# Patient Record
Sex: MALE | Race: WHITE | NOT HISPANIC OR LATINO | Employment: FULL TIME | URBAN - METROPOLITAN AREA
[De-identification: names, ages, dates, MRNs, and addresses within clinical notes are randomized per-mention and may not be internally consistent; named-entity substitution may affect disease eponyms.]

---

## 2024-02-19 PROBLEM — I25.10 CAD (CORONARY ARTERY DISEASE): Status: ACTIVE | Noted: 2024-02-19

## 2024-02-19 PROBLEM — N50.819 PERSISTENT PAIN IN TESTICLE: Status: ACTIVE | Noted: 2024-02-19

## 2024-02-19 PROBLEM — M43.10 PARS DEFECT WITH SPONDYLOLISTHESIS: Status: ACTIVE | Noted: 2024-02-19

## 2024-02-19 PROBLEM — E78.5 DYSLIPIDEMIA: Status: ACTIVE | Noted: 2024-02-19

## 2024-02-19 PROBLEM — J02.9 SORE THROAT: Status: ACTIVE | Noted: 2024-02-19

## 2024-02-19 PROBLEM — R31.9 HEMATURIA: Status: ACTIVE | Noted: 2024-02-19

## 2024-02-19 PROBLEM — R42 LIGHTHEADEDNESS: Status: ACTIVE | Noted: 2024-02-19

## 2024-02-19 PROBLEM — G47.33 OBSTRUCTIVE SLEEP APNEA: Status: ACTIVE | Noted: 2024-02-19

## 2024-02-19 PROBLEM — J06.9 URI WITH COUGH AND CONGESTION: Status: ACTIVE | Noted: 2024-02-19

## 2024-02-19 PROBLEM — R36.1 HEMATOSPERMIA: Status: ACTIVE | Noted: 2024-02-19

## 2024-02-19 PROBLEM — E83.52 HYPERCALCEMIA: Status: ACTIVE | Noted: 2024-02-19

## 2024-02-19 PROBLEM — R05.9 COUGH: Status: ACTIVE | Noted: 2024-02-19

## 2024-02-19 PROBLEM — I21.3 ST ELEVATION MI (STEMI) (MULTI): Status: ACTIVE | Noted: 2024-02-19

## 2024-02-19 RX ORDER — ASPIRIN 81 MG/1
81 TABLET ORAL DAILY
COMMUNITY
Start: 2021-06-28

## 2024-02-19 RX ORDER — NITROGLYCERIN 0.4 MG/1
0.4 TABLET SUBLINGUAL EVERY 5 MIN PRN
COMMUNITY
Start: 2021-06-28

## 2024-02-19 RX ORDER — GUAIFENESIN 600 MG/1
600 TABLET, EXTENDED RELEASE ORAL EVERY 12 HOURS PRN
COMMUNITY

## 2024-02-19 RX ORDER — ACETAMINOPHEN 325 MG/1
325 TABLET ORAL EVERY 6 HOURS PRN
COMMUNITY
Start: 2021-07-01

## 2024-02-19 RX ORDER — METOPROLOL SUCCINATE 25 MG/1
25 TABLET, EXTENDED RELEASE ORAL DAILY
COMMUNITY
Start: 2021-07-19

## 2024-02-19 RX ORDER — ATORVASTATIN CALCIUM 80 MG/1
80 TABLET, FILM COATED ORAL DAILY
COMMUNITY
Start: 2022-03-07 | End: 2024-03-29

## 2024-03-29 DIAGNOSIS — E78.5 DYSLIPIDEMIA: ICD-10-CM

## 2024-03-29 RX ORDER — ATORVASTATIN CALCIUM 80 MG/1
80 TABLET, FILM COATED ORAL DAILY
Qty: 90 TABLET | Refills: 3 | Status: SHIPPED | OUTPATIENT
Start: 2024-03-29

## 2024-06-20 ENCOUNTER — APPOINTMENT (OUTPATIENT)
Dept: CARDIOLOGY | Facility: CLINIC | Age: 49
End: 2024-06-20
Payer: COMMERCIAL

## 2024-06-20 VITALS
HEART RATE: 66 BPM | WEIGHT: 196 LBS | OXYGEN SATURATION: 97 % | DIASTOLIC BLOOD PRESSURE: 70 MMHG | BODY MASS INDEX: 28.06 KG/M2 | SYSTOLIC BLOOD PRESSURE: 134 MMHG | HEIGHT: 70 IN

## 2024-06-20 DIAGNOSIS — E78.5 DYSLIPIDEMIA: ICD-10-CM

## 2024-06-20 DIAGNOSIS — R03.0 ELEVATED BLOOD PRESSURE READING IN OFFICE WITH WHITE COAT SYNDROME, WITHOUT DIAGNOSIS OF HYPERTENSION: ICD-10-CM

## 2024-06-20 DIAGNOSIS — I25.10 CORONARY ARTERY DISEASE INVOLVING NATIVE CORONARY ARTERY OF NATIVE HEART WITHOUT ANGINA PECTORIS: Primary | ICD-10-CM

## 2024-06-20 PROCEDURE — 93000 ELECTROCARDIOGRAM COMPLETE: CPT | Performed by: INTERNAL MEDICINE

## 2024-06-20 PROCEDURE — 99214 OFFICE O/P EST MOD 30 MIN: CPT | Performed by: INTERNAL MEDICINE

## 2024-06-20 PROCEDURE — 1036F TOBACCO NON-USER: CPT | Performed by: INTERNAL MEDICINE

## 2024-06-20 ASSESSMENT — ENCOUNTER SYMPTOMS
LOSS OF SENSATION IN FEET: 0
DEPRESSION: 0
OCCASIONAL FEELINGS OF UNSTEADINESS: 0

## 2024-06-20 ASSESSMENT — PAIN SCALES - GENERAL: PAINLEVEL: 0-NO PAIN

## 2024-06-20 NOTE — PROGRESS NOTES
Chief Complaint:   No chief complaint on file.     History Of Present Illness:    Jarad Johns is a 48 y.o. male with a h/o CAD s/p STEMI 6/21/21 (PCI of the LCx and RCA), mild mitral and aortic regurgitation, and dyslipidemia who is here for routine follow-up.      Had 1 episode of a blurred hemispherical in the left side of his vision of his left and right eyes while he was at work.  Lasted several seconds and then subsided.  Has not recurred.  1 other day he was outside in the heat at a baseball game.  He came back in and sat down and had double vision for about 30 seconds.  Has not had that recur either.  His wife remembers the that was very warm that day.    Denies any exertional chest pain or shortness of breath.  Does have pain in the back.  If he straightens his shoulders or cracks his back the symptom goes away.     His father had carotid disease and carotid endarterectomy in his 50s.     Echocardiogram 6/13/2022: EF 55 to 60%. Mild left atrial enlargement. Mild MR. Mild AR.     PCI of the RCA 7/1/21: Resolute [place] 3.5 x 30 mm LAMAR     Echocardiogram 6/22/21 EF 50-55%. Basal to apical inferolateral, and mid anterolateral wall hypokinesis. Mild AR     iFR of the LAD 6/22/21: mid LAD 0.99, mid distal 0.91     6/21/21: STEMI with PCI of the OM1 with Resolute [place] 3 x 18 mm LAMAR      Past Medical History:  He has a past medical history of Other conditions influencing health status (09/09/2020).    Past Surgical History:  He has a past surgical history that includes Other surgical history (08/21/2020).      Social History:  He reports that he has never smoked. He has never used smokeless tobacco. He reports current alcohol use. He reports that he does not use drugs.    Family History:  No family history on file.     Allergies:  Penicillins    Outpatient Medications:  Current Outpatient Medications   Medication Instructions    acetaminophen (TYLENOL) 325 mg, oral, Every 6 hours PRN    aspirin 81 mg, oral, Daily  "   atorvastatin (LIPITOR) 80 mg, oral, Daily    guaiFENesin (MUCINEX) 600 mg, oral, Every 12 hours PRN       Last Recorded Vitals:  Visit Vitals  /70 (BP Location: Left arm, Patient Position: Sitting)   Pulse 66   Ht 1.778 m (5' 10\")   Wt 88.9 kg (196 lb)   SpO2 97%   BMI 28.12 kg/m²   Smoking Status Never   BSA 2.1 m²      LASTWT(3):   Wt Readings from Last 3 Encounters:   06/20/24 88.9 kg (196 lb)   06/19/23 87.5 kg (193 lb)   12/14/22 84.8 kg (187 lb)       Physical Exam:  In general: alert and in no acute distress.   HEENT: Carotid upstrokes normal with no bruits. JVP is normal.   Pulmonary: Clear to auscultation bilaterally.  Cardiovascular: S1,S2, regular. No appreciable murmurs, rubs or gallops.   Lower extremities: Warm. 2+ distal pulses. No edema.       Last Labs:  CBC -  Recent Labs     02/04/23 0941 06/21/21 1940 08/21/20  1439   WBC 3.6* 6.2 5.1   HGB 15.2 15.8 15.0   HCT 43.4 45.9 44.6    207 189   MCV 91 86 91       CMP -  Recent Labs     02/04/23 0941 06/23/21  0420 06/22/21 0403 06/21/21  2252    136 137  --    K 4.4 4.3 4.0  --     107 104  --    CO2 31 23 22  --    ANIONGAP 9* 10 15  --    BUN 18 15 12  --    CREATININE 0.93 0.88 0.82  --    MG  --  2.40 2.20 2.20     Recent Labs     02/04/23 0941 06/21/21 1940 08/21/20  1439   ALBUMIN 4.7 5.3* 4.9   ALKPHOS 42 50 42   ALT 34 32 25   AST 27 28 21   BILITOT 0.9 0.7 0.7       LIPID PANEL -   Recent Labs     02/04/23 0941 06/22/21 0403   CHOL 119 191   LDLF 65 128*   HDL 43.0 33.0*   TRIG 57 152*       Recent Labs     06/22/21 0403 06/21/21 1940   BNP  --  54   HGBA1C 5.1  --            Assessment/Plan   1) CAD with STEMI 6/21/21 successful PCI of the OM branch. Interval PCI of the RCA. Doing well. No need for additional testing at this time.  Did speak about the ISCHEMIA Trial.    Last echocardiogram June 2022 with normal LV function.  Told him we would consider routine echocardiography every 5 years     2) " dyslipidemia: Continue high-intensity statin. LDL at goal.  Told him that I doubted the back pain was from his statin.  Would feel better about him staying on statin rather than stopping it and seeing if the back pain improved.     3) elevated blood pressure without hypertension: Blood pressure been well-controlled.  No changes needed.     4) visual disturbance: First episode sounds like an ocular migraine.  I do not know how to explain the double vision episode.  Asked him to speak with an ophthalmologist.     5) follow-up: 12 months or sooner if needed       Rip Thapa MD

## 2024-07-26 DIAGNOSIS — I25.10 CORONARY ARTERY DISEASE, UNSPECIFIED VESSEL OR LESION TYPE, UNSPECIFIED WHETHER ANGINA PRESENT, UNSPECIFIED WHETHER NATIVE OR TRANSPLANTED HEART: ICD-10-CM

## 2024-07-26 RX ORDER — ASPIRIN 81 MG/1
81 TABLET ORAL DAILY
Qty: 90 TABLET | Refills: 3 | Status: SHIPPED | OUTPATIENT
Start: 2024-07-26

## 2024-10-09 ENCOUNTER — TELEPHONE (OUTPATIENT)
Dept: PEDIATRICS | Facility: CLINIC | Age: 49
End: 2024-10-09
Payer: COMMERCIAL

## 2024-10-09 NOTE — TELEPHONE ENCOUNTER
Pt is in need of a new PCP and was recommended to Dr. Wilder by his cardiologist, Dr. Thapa. He would like to be a new pt if possible.

## 2024-11-04 ENCOUNTER — APPOINTMENT (OUTPATIENT)
Dept: PRIMARY CARE | Facility: CLINIC | Age: 49
End: 2024-11-04
Payer: COMMERCIAL

## 2024-11-04 VITALS
HEART RATE: 70 BPM | DIASTOLIC BLOOD PRESSURE: 70 MMHG | TEMPERATURE: 97 F | SYSTOLIC BLOOD PRESSURE: 128 MMHG | OXYGEN SATURATION: 98 % | WEIGHT: 203 LBS | RESPIRATION RATE: 14 BRPM | HEIGHT: 70 IN | BODY MASS INDEX: 29.06 KG/M2

## 2024-11-04 DIAGNOSIS — Z00.00 PERIODIC HEALTH ASSESSMENT, GENERAL SCREENING, ADULT: ICD-10-CM

## 2024-11-04 DIAGNOSIS — I10 ESSENTIAL (PRIMARY) HYPERTENSION: ICD-10-CM

## 2024-11-04 DIAGNOSIS — I25.10 CORONARY ARTERY DISEASE INVOLVING NATIVE CORONARY ARTERY OF NATIVE HEART WITHOUT ANGINA PECTORIS: ICD-10-CM

## 2024-11-04 DIAGNOSIS — E78.00 PURE HYPERCHOLESTEROLEMIA: ICD-10-CM

## 2024-11-04 DIAGNOSIS — Z12.5 SCREENING FOR PROSTATE CANCER: ICD-10-CM

## 2024-11-04 DIAGNOSIS — R03.0 ELEVATED BLOOD PRESSURE READING IN OFFICE WITH WHITE COAT SYNDROME, WITHOUT DIAGNOSIS OF HYPERTENSION: Primary | ICD-10-CM

## 2024-11-04 PROCEDURE — 3078F DIAST BP <80 MM HG: CPT | Performed by: INTERNAL MEDICINE

## 2024-11-04 PROCEDURE — 90471 IMMUNIZATION ADMIN: CPT | Performed by: INTERNAL MEDICINE

## 2024-11-04 PROCEDURE — 3008F BODY MASS INDEX DOCD: CPT | Performed by: INTERNAL MEDICINE

## 2024-11-04 PROCEDURE — 99396 PREV VISIT EST AGE 40-64: CPT | Performed by: INTERNAL MEDICINE

## 2024-11-04 PROCEDURE — 1036F TOBACCO NON-USER: CPT | Performed by: INTERNAL MEDICINE

## 2024-11-04 PROCEDURE — 3074F SYST BP LT 130 MM HG: CPT | Performed by: INTERNAL MEDICINE

## 2024-11-04 PROCEDURE — 90656 IIV3 VACC NO PRSV 0.5 ML IM: CPT | Performed by: INTERNAL MEDICINE

## 2024-11-04 RX ORDER — MINERAL OIL
180 ENEMA (ML) RECTAL DAILY
COMMUNITY

## 2024-11-04 ASSESSMENT — ENCOUNTER SYMPTOMS
PALPITATIONS: 0
WHEEZING: 0
SHORTNESS OF BREATH: 0
BACK PAIN: 1
CONSTIPATION: 0
NAUSEA: 0
DIARRHEA: 0
COUGH: 0
ABDOMINAL PAIN: 0

## 2024-11-04 NOTE — PROGRESS NOTES
"Subjective   Patient ID: Jarad Johns is a 48 y.o. male who presents for Annual Exam (npt).    Overall doing well.  Patient is fairly active.  Denies any issues with CP,SOB or dizzy spells.  No issues with anxiety, depression or sleep related problems. Denies any issues with HA, numbness or tingling.  No issues or changes with bowel or bladder habits.      Review of Systems   Respiratory:  Negative for cough, shortness of breath and wheezing.    Cardiovascular:  Negative for palpitations.   Gastrointestinal:  Negative for abdominal pain, constipation, diarrhea and nausea.   Musculoskeletal:  Positive for back pain.   ROS is otherwise unremarkable.       Objective   /70 (BP Location: Left arm, Patient Position: Sitting, BP Cuff Size: Adult)   Pulse 70   Temp 36.1 °C (97 °F) (Tympanic)   Resp 14   Ht 1.778 m (5' 10\")   Wt 92.1 kg (203 lb)   SpO2 98%   BMI 29.13 kg/m²     Physical Exam  Vitals reviewed.   Constitutional:       Appearance: Normal appearance.   HENT:      Head: Normocephalic.   Eyes:      Pupils: Pupils are equal, round, and reactive to light.   Cardiovascular:      Rate and Rhythm: Normal rate and regular rhythm.   Pulmonary:      Effort: Pulmonary effort is normal.      Breath sounds: Normal breath sounds.   Musculoskeletal:         General: Normal range of motion.   Neurological:      General: No focal deficit present.      Mental Status: He is alert.   Psychiatric:         Mood and Affect: Mood normal.         Assessment/Plan   Problem List Items Addressed This Visit             ICD-10-CM    CAD (coronary artery disease) I25.10    Elevated blood pressure reading in office with white coat syndrome, without diagnosis of hypertension - Primary R03.0     Other Visit Diagnoses         Codes    Periodic health assessment, general screening, adult     Z00.00    Relevant Orders    CBC    Comprehensive Metabolic Panel    Lipid Panel    Thyroid Stimulating Hormone    Colonoscopy Screening; " Average Risk Patient    Essential (primary) hypertension     I10    Pure hypercholesterolemia     E78.00    Screening for prostate cancer     Z12.5    Relevant Orders    Prostate Specific Antigen        Physical exam is unremarkable.  We reviewed and discussed all the above.  We discussed current medications as well as most recent test results.  He is due for metabolic and cardiac risk assessment.  He is also due for colonoscopy.    We discussed the importance and benefits of a healthy diet that is both low in sugars and low in saturated fats.  We reviewed and discussed the benefits of regular physical exercise especially when at or above a level of 150 minutes/week.  We also discussed the importance of stress management and good sleep hygiene.  We will continue to work on lifestyle improvements and follow-up in 6 to 12 months, sooner if any issues should arise.

## 2024-11-10 ENCOUNTER — APPOINTMENT (OUTPATIENT)
Dept: PRIMARY CARE | Facility: CLINIC | Age: 49
End: 2024-11-10
Payer: COMMERCIAL

## 2024-11-11 ENCOUNTER — TELEPHONE (OUTPATIENT)
Dept: GASTROENTEROLOGY | Facility: EXTERNAL LOCATION | Age: 49
End: 2024-11-11
Payer: COMMERCIAL

## 2024-11-13 ENCOUNTER — LAB (OUTPATIENT)
Dept: LAB | Facility: LAB | Age: 49
End: 2024-11-13
Payer: COMMERCIAL

## 2024-11-13 DIAGNOSIS — Z12.5 SCREENING FOR PROSTATE CANCER: ICD-10-CM

## 2024-11-13 DIAGNOSIS — Z00.00 PERIODIC HEALTH ASSESSMENT, GENERAL SCREENING, ADULT: ICD-10-CM

## 2024-11-13 DIAGNOSIS — I25.10 CORONARY ARTERY DISEASE INVOLVING NATIVE CORONARY ARTERY OF NATIVE HEART WITHOUT ANGINA PECTORIS: ICD-10-CM

## 2024-11-13 DIAGNOSIS — R74.01 TRANSAMINITIS: Primary | ICD-10-CM

## 2024-11-13 DIAGNOSIS — E78.5 DYSLIPIDEMIA: ICD-10-CM

## 2024-11-13 LAB
ALBUMIN SERPL BCP-MCNC: 4.8 G/DL (ref 3.4–5)
ALP SERPL-CCNC: 51 U/L (ref 33–120)
ALT SERPL W P-5'-P-CCNC: 55 U/L (ref 10–52)
ANION GAP SERPL CALC-SCNC: 8 MMOL/L (ref 10–20)
AST SERPL W P-5'-P-CCNC: 37 U/L (ref 9–39)
BILIRUB SERPL-MCNC: 0.8 MG/DL (ref 0–1.2)
BUN SERPL-MCNC: 16 MG/DL (ref 6–23)
CALCIUM SERPL-MCNC: 9.5 MG/DL (ref 8.6–10.3)
CHLORIDE SERPL-SCNC: 105 MMOL/L (ref 98–107)
CHOLEST SERPL-MCNC: 114 MG/DL (ref 0–199)
CHOLESTEROL/HDL RATIO: 3.5
CO2 SERPL-SCNC: 31 MMOL/L (ref 21–32)
CREAT SERPL-MCNC: 0.93 MG/DL (ref 0.5–1.3)
EGFRCR SERPLBLD CKD-EPI 2021: >90 ML/MIN/1.73M*2
ERYTHROCYTE [DISTWIDTH] IN BLOOD BY AUTOMATED COUNT: 11.9 % (ref 11.5–14.5)
GLUCOSE SERPL-MCNC: 94 MG/DL (ref 74–99)
HCT VFR BLD AUTO: 41.9 % (ref 41–52)
HDLC SERPL-MCNC: 32.5 MG/DL
HGB BLD-MCNC: 14.6 G/DL (ref 13.5–17.5)
LDLC SERPL CALC-MCNC: 63 MG/DL
MCH RBC QN AUTO: 31.1 PG (ref 26–34)
MCHC RBC AUTO-ENTMCNC: 34.8 G/DL (ref 32–36)
MCV RBC AUTO: 89 FL (ref 80–100)
NON HDL CHOLESTEROL: 82 MG/DL (ref 0–149)
NRBC BLD-RTO: 0 /100 WBCS (ref 0–0)
PLATELET # BLD AUTO: 184 X10*3/UL (ref 150–450)
POTASSIUM SERPL-SCNC: 4.4 MMOL/L (ref 3.5–5.3)
PROT SERPL-MCNC: 7.5 G/DL (ref 6.4–8.2)
PSA SERPL-MCNC: 0.61 NG/ML
RBC # BLD AUTO: 4.7 X10*6/UL (ref 4.5–5.9)
SODIUM SERPL-SCNC: 140 MMOL/L (ref 136–145)
TRIGL SERPL-MCNC: 92 MG/DL (ref 0–149)
TSH SERPL-ACNC: 1.47 MIU/L (ref 0.44–3.98)
VLDL: 18 MG/DL (ref 0–40)
WBC # BLD AUTO: 5.3 X10*3/UL (ref 4.4–11.3)

## 2024-11-13 PROCEDURE — 85027 COMPLETE CBC AUTOMATED: CPT

## 2024-11-13 PROCEDURE — 84153 ASSAY OF PSA TOTAL: CPT

## 2024-11-13 PROCEDURE — 36415 COLL VENOUS BLD VENIPUNCTURE: CPT

## 2024-11-13 PROCEDURE — 80053 COMPREHEN METABOLIC PANEL: CPT

## 2024-11-13 PROCEDURE — 84443 ASSAY THYROID STIM HORMONE: CPT

## 2024-11-13 PROCEDURE — 80061 LIPID PANEL: CPT

## 2024-11-14 ENCOUNTER — TELEPHONE (OUTPATIENT)
Dept: CARDIOLOGY | Facility: CLINIC | Age: 49
End: 2024-11-14
Payer: COMMERCIAL

## 2024-11-14 NOTE — TELEPHONE ENCOUNTER
----- Message from Rip Thapa sent at 11/13/2024  1:51 PM EST -----  Please let the patient know that his blood work looked good.  The cholesterol is well-controlled and the blood counts are stable.  His kidney function is also normal.  One of the liver functions was very minimally elevated.  This is usually nothing to worry about.  I think it would be safest just to repeat the liver functions in 2 to 4 weeks to make sure that value has not changed or has gone down.    For now I would make no changes and simply get the LFTs done in 2 to 4 weeks.

## 2024-11-14 NOTE — TELEPHONE ENCOUNTER
Result Communication    Resulted Orders   Lipid Panel   Result Value Ref Range    Cholesterol 114 0 - 199 mg/dL      Comment:            Age      Desirable   Borderline High   High     0-19 Y     0 - 169       170 - 199     >/= 200    20-24 Y     0 - 189       190 - 224     >/= 225         >24 Y     0 - 199       200 - 239     >/= 240   **All ranges are based on fasting samples. Specific   therapeutic targets will vary based on patient-specific   cardiac risk.    Pediatric guidelines reference:Pediatrics 2011, 128(S5).Adult guidelines reference: NCEP ATPIII Guidelines,PADILLA 2001, 258:2486-97    Venipuncture immediately after or during the administration of Metamizole may lead to falsely low results. Testing should be performed immediately prior to Metamizole dosing.    HDL-Cholesterol 32.5 mg/dL      Comment:        Age       Very Low   Low     Normal    High    0-19 Y    < 35      < 40     40-45     ----  20-24 Y    ----     < 40      >45      ----        >24 Y      ----     < 40     40-60      >60      Cholesterol/HDL Ratio 3.5       Comment:        Ref Values  Desirable  < 3.4  High Risk  > 5.0    LDL Calculated 63 <=99 mg/dL      Comment:                                  Near   Borderline      AGE      Desirable  Optimal    High     High     Very High     0-19 Y     0 - 109     ---    110-129   >/= 130     ----    20-24 Y     0 - 119     ---    120-159   >/= 160     ----      >24 Y     0 -  99   100-129  130-159   160-189     >/=190      VLDL 18 0 - 40 mg/dL    Triglycerides 92 0 - 149 mg/dL      Comment:      Age              Desirable        Borderline         High        Very High  SEX:B           mg/dL             mg/dL               mg/dL      mg/dL  <=14D                       ----               ----        ----  15D-365D                    ----               ----        ----  1Y-9Y           0-74               75-99             >=100       ----  10Y-19Y        0-89                             >=130       ----  20Y-24Y        0-114             115-149             >=150      ----  >= 25Y         0-149             150-199             200-499    >=500      Venipuncture immediately after or during the administration of Metamizole may lead to falsely low results. Testing should be performed immediately prior to Metamizole dosing.    Non HDL Cholesterol 82 0 - 149 mg/dL      Comment:            Age       Desirable   Borderline High   High     Very High     0-19 Y     0 - 119       120 - 144     >/= 145    >/= 160    20-24 Y     0 - 149       150 - 189     >/= 190      ----         >24 Y    30 mg/dL above LDL Cholesterol goal     Comprehensive Metabolic Panel   Result Value Ref Range    Glucose 94 74 - 99 mg/dL    Sodium 140 136 - 145 mmol/L    Potassium 4.4 3.5 - 5.3 mmol/L    Chloride 105 98 - 107 mmol/L    Bicarbonate 31 21 - 32 mmol/L    Anion Gap 8 (L) 10 - 20 mmol/L    Urea Nitrogen 16 6 - 23 mg/dL    Creatinine 0.93 0.50 - 1.30 mg/dL    eGFR >90 >60 mL/min/1.73m*2      Comment:      Calculations of estimated GFR are performed using the 2021 CKD-EPI Study Refit equation without the race variable for the IDMS-Traceable creatinine methods.  https://jasn.asnjournals.org/content/early/2021/09/22/ASN.5088431053    Calcium 9.5 8.6 - 10.3 mg/dL    Albumin 4.8 3.4 - 5.0 g/dL    Alkaline Phosphatase 51 33 - 120 U/L    Total Protein 7.5 6.4 - 8.2 g/dL    AST 37 9 - 39 U/L    Bilirubin, Total 0.8 0.0 - 1.2 mg/dL    ALT 55 (H) 10 - 52 U/L      Comment:      Patients treated with Sulfasalazine may generate falsely decreased results for ALT.   CBC   Result Value Ref Range    WBC 5.3 4.4 - 11.3 x10*3/uL    nRBC 0.0 0.0 - 0.0 /100 WBCs    RBC 4.70 4.50 - 5.90 x10*6/uL    Hemoglobin 14.6 13.5 - 17.5 g/dL    Hematocrit 41.9 41.0 - 52.0 %    MCV 89 80 - 100 fL    MCH 31.1 26.0 - 34.0 pg    MCHC 34.8 32.0 - 36.0 g/dL    RDW 11.9 11.5 - 14.5 %    Platelets 184 150 - 450 x10*3/uL       1:19 PM      Results were successfully  communicated with the patient and they acknowledged their understanding.  Result Communication

## 2025-02-21 ENCOUNTER — OFFICE VISIT (OUTPATIENT)
Dept: URGENT CARE | Age: 50
End: 2025-02-21
Payer: COMMERCIAL

## 2025-02-21 VITALS
HEART RATE: 60 BPM | SYSTOLIC BLOOD PRESSURE: 134 MMHG | DIASTOLIC BLOOD PRESSURE: 74 MMHG | RESPIRATION RATE: 15 BRPM | OXYGEN SATURATION: 97 % | TEMPERATURE: 98.4 F

## 2025-02-21 DIAGNOSIS — B34.9 VIRAL ILLNESS: Primary | ICD-10-CM

## 2025-02-21 LAB
POC RAPID INFLUENZA A: NEGATIVE
POC RAPID INFLUENZA B: NEGATIVE
POC SARS-COV-2 AG BINAX: NORMAL

## 2025-02-21 RX ORDER — METHYLPREDNISOLONE 4 MG/1
TABLET ORAL
Qty: 21 TABLET | Refills: 0 | Status: SHIPPED | OUTPATIENT
Start: 2025-02-21

## 2025-02-21 ASSESSMENT — ENCOUNTER SYMPTOMS
SINUS PAIN: 0
SINUS PRESSURE: 0
COUGH: 1
RHINORRHEA: 1
FEVER: 0
CHILLS: 0
SHORTNESS OF BREATH: 0
ABDOMINAL PAIN: 1
WHEEZING: 0
SORE THROAT: 1

## 2025-02-21 NOTE — PROGRESS NOTES
Subjective   Patient ID: Jarad Johns is a 49 y.o. male. They present today with a chief complaint of Abdominal Pain, Cough, and Head Congestion .    History of Present Illness    Patient presents for flu like symptoms. States today he has been feeling better. Had one day where he had abdominal pain and diarrhea but that has subsided. He has a productive cough which is possibly getting better. Also with congestion. He has tried OTC with mild relief.     Abdominal Pain  Pertinent negatives include no fever.   Cough  Associated symptoms include postnasal drip, rhinorrhea and a sore throat. Pertinent negatives include no chills, ear pain, fever, shortness of breath or wheezing.     Past Medical History  Allergies as of 02/21/2025 - Reviewed 02/21/2025   Allergen Reaction Noted    Erythromycin Constipation 11/04/2024    Penicillins Unknown 02/19/2024       (Not in a hospital admission)       Past Medical History:   Diagnosis Date    Other conditions influencing health status 09/09/2020    Patient denies significant medical history       Past Surgical History:   Procedure Laterality Date    OTHER SURGICAL HISTORY  08/21/2020    Vasectomy        reports that he has never smoked. He has never used smokeless tobacco. He reports current alcohol use. He reports that he does not use drugs.    Review of Systems  Review of Systems   Constitutional:  Negative for chills and fever.   HENT:  Positive for congestion, postnasal drip, rhinorrhea and sore throat. Negative for ear pain, sinus pressure and sinus pain.    Respiratory:  Positive for cough. Negative for shortness of breath and wheezing.    Gastrointestinal:  Positive for abdominal pain (resolved).           Objective    Vitals:    02/21/25 1440   BP: 134/74   Pulse: 60   Resp: 15   Temp: 36.9 °C (98.4 °F)   SpO2: 97%     No LMP for male patient.    Physical Exam  Constitutional:       General: He is not in acute distress.     Appearance: Normal appearance. He is not  ill-appearing or toxic-appearing.   HENT:      Head: Normocephalic and atraumatic.      Right Ear: A middle ear effusion is present. Tympanic membrane is not erythematous or bulging.      Left Ear: A middle ear effusion is present. Tympanic membrane is not erythematous or bulging.      Nose: Nose normal.      Mouth/Throat:      Pharynx: Posterior oropharyngeal erythema present. No pharyngeal swelling or oropharyngeal exudate.   Cardiovascular:      Rate and Rhythm: Normal rate and regular rhythm.      Heart sounds: Normal heart sounds.   Pulmonary:      Effort: Pulmonary effort is normal. No respiratory distress.      Breath sounds: Normal breath sounds.   Skin:     General: Skin is warm and dry.   Neurological:      Mental Status: He is alert.         Procedures    Point of Care Test & Imaging Results from this visit  Results for orders placed or performed in visit on 02/21/25   POCT Covid-19 Rapid Antigen   Result Value Ref Range    POC DAMON-COV-2 AG  Presumptive negative test for SARS-CoV-2 (no antigen detected)     Presumptive negative test for SARS-CoV-2 (no antigen detected)   POCT Influenza A/B manually resulted   Result Value Ref Range    POC Rapid Influenza A Negative Negative    POC Rapid Influenza B Negative Negative      No results found.    Diagnostic study results (if any) were reviewed by EVELIN Hassan.    Assessment/Plan   Allergies, medications, history, and pertinent labs/EKGs/Imaging reviewed by EVELIN Hassan.     Medical Decision Making  Symptoms consistent with viral illness.  At time of discharge patient was clinically well-appearing and HDS for outpatient management. He was educated regarding diagnosis, supportive care, OTC and Rx medications. He was given the opportunity to ask questions prior to discharge.  They verbalized understanding of my discussion of the plans for treatment, expected course, indications to return to  or seek further evaluation in ED, and the  need for timely follow up as directed.     Orders and Diagnoses  Diagnoses and all orders for this visit:  Viral illness  -     POCT Covid-19 Rapid Antigen  -     POCT Influenza A/B manually resulted  -     methylPREDNISolone (Medrol Dospak) 4 mg tablets; Take as directed on package.      Medical Admin Record      Patient disposition: Home    Electronically signed by EVELIN Hassan  2:55 PM

## 2025-03-24 DIAGNOSIS — E78.5 DYSLIPIDEMIA: ICD-10-CM

## 2025-03-24 RX ORDER — ATORVASTATIN CALCIUM 80 MG/1
80 TABLET, FILM COATED ORAL DAILY
Qty: 90 TABLET | Refills: 3 | Status: SHIPPED | OUTPATIENT
Start: 2025-03-24

## 2025-06-23 PROBLEM — I21.3 ST ELEVATION MI (STEMI) (MULTI): Status: RESOLVED | Noted: 2024-02-19 | Resolved: 2025-06-23

## 2025-06-23 NOTE — PROGRESS NOTES
Chief Complaint:   No chief complaint on file.     History Of Present Illness:    Jarad Johns is a 49 y.o. male with a h/o CAD s/p STEMI 6/21/21 (PCI of the LCx and RCA), mild mitral and aortic regurgitation, and dyslipidemia who is here for routine follow-up.     Overall is doing well.  He is still exercising about twice a week.  He has a hard time getting any more episodes and because of work and family life.  He is quite active during those times.  When he first starts exercising he feels as if a string is being pulled in front of his chest.  It is mild and is not a pain.  It is more uncomfortable.  It goes away when he continues.    He still feels that he is sucking air when he is working out but that has not worsened.  He feels good on the day of workout but then feels fatigued on the days he is not working out.  He tries to get good sleep but finds that he is only getting about 5 and half hours.  He is trying to improve that during the summertime.    He had another episode of visual hallucination in the right lower outer quadrant of his right eye about 3 weeks ago.  This is the first episode he has had since he last saw me.    Once again he has a pain in his back/chest which occurs from time to time.  If he straightens out and cracks his back the symptom goes away.     His father had carotid disease and carotid endarterectomy in his 50s.     Echocardiogram 6/13/2022: EF 55 to 60%. Mild left atrial enlargement. Mild MR. Mild AR.     PCI of the RCA 7/1/21: Resolute [place] 3.5 x 30 mm LAMAR     Echocardiogram 6/22/21 EF 50-55%. Basal to apical inferolateral, and mid anterolateral wall hypokinesis. Mild AR     iFR of the LAD 6/22/21: mid LAD 0.99, mid distal 0.91     6/21/21: STEMI with PCI of the OM1 with Resolute [place] 3 x 18 mm LAMAR        Allergies:  Erythromycin and Penicillins    Outpatient Medications:  Current Outpatient Medications   Medication Instructions    aspirin 81 mg, oral, Daily    atorvastatin  "(LIPITOR) 80 mg, oral, Daily    fexofenadine (ALLEGRA) 180 mg, Daily       Last Recorded Vitals:  Visit Vitals  /60 (BP Location: Right arm, Patient Position: Sitting)   Pulse 65   Ht 1.778 m (5' 10\")   Wt 92.1 kg (203 lb)   SpO2 98%   BMI 29.13 kg/m²   Smoking Status Never   BSA 2.13 m²      LASTWT(3):   Wt Readings from Last 3 Encounters:   06/24/25 92.1 kg (203 lb)   11/04/24 92.1 kg (203 lb)   06/20/24 88.9 kg (196 lb)       Physical Exam:  In general: alert and in no acute distress.   HEENT: Carotid upstrokes normal with no bruits. JVP is normal.   Pulmonary: Clear to auscultation bilaterally.  Cardiovascular: S1,S2, regular. No appreciable murmurs, rubs or gallops.   Lower extremities: Warm. 2+ distal pulses. No edema.       Last Labs:  CBC -  Recent Labs     11/13/24 0859 02/04/23  0941 06/21/21 1940   WBC 5.3 3.6* 6.2   HGB 14.6 15.2 15.8   HCT 41.9 43.4 45.9    171 207   MCV 89 91 86       CMP -  Recent Labs     11/13/24 0859 02/04/23  0941 06/23/21  0420 06/22/21  0403 06/21/21  2252    138 136 137  --    K 4.4 4.4 4.3 4.0  --     102 107 104  --    CO2 31 31 23 22  --    ANIONGAP 8* 9* 10 15  --    BUN 16 18 15 12  --    CREATININE 0.93 0.93 0.88 0.82  --    EGFR >90  --   --   --   --    MG  --   --  2.40 2.20 2.20     Recent Labs     11/13/24 0859 02/04/23  0941 06/21/21 1940   ALBUMIN 4.8 4.7 5.3*   ALKPHOS 51 42 50   ALT 55* 34 32   AST 37 27 28   BILITOT 0.8 0.9 0.7       LIPID PANEL -   Recent Labs     11/13/24 0859 02/04/23  0941 06/22/21  0403   CHOL 114 119 191   LDLCALC 63  --   --    LDLF  --  65 128*   HDL 32.5 43.0 33.0*   TRIG 92 57 152*       Recent Labs     06/22/21  0403 06/21/21 1940   BNP  --  54   HGBA1C 5.1  --            Assessment/Plan   1) CAD with STEMI 6/21/21 successful PCI of the OM branch. Interval PCI of the RCA.  Although he has a mild discomfort in the chest when he for starts exercising and goes away with continued exercise.  I do not " believe that this is an anginal equivalent.  No other signs or symptoms of obstructive coronary disease.  No need for additional testing at this time.  Based on the ISCHEMIA Trial there is no need for routine stress testing.    Last echocardiogram June 2022 with normal LV function.  Told him we would consider routine echocardiography every 5 years     2) dyslipidemia: LDL at goal of less than 70.  Continue atorvastatin 80 mg daily.     3) elevated blood pressure without hypertension: Blood pressure been well-controlled here.  Says from time to time it will be in the upper 130s over upper 80s.  Believes that it might be related to his caffeine intake.  If he notices persistently elevated blood pressure he will call and we can always discuss medications.     4) visual disturbance: Once again sounds like an ocular migraine or migraine with aura.  If it continues to occur he can speak to a neurologist.     5) follow-up: 12 months or sooner if needed       Rip Thapa MD

## 2025-06-24 ENCOUNTER — APPOINTMENT (OUTPATIENT)
Dept: CARDIOLOGY | Facility: CLINIC | Age: 50
End: 2025-06-24
Payer: COMMERCIAL

## 2025-06-24 VITALS
SYSTOLIC BLOOD PRESSURE: 120 MMHG | OXYGEN SATURATION: 98 % | HEART RATE: 65 BPM | BODY MASS INDEX: 29.06 KG/M2 | DIASTOLIC BLOOD PRESSURE: 60 MMHG | WEIGHT: 203 LBS | HEIGHT: 70 IN

## 2025-06-24 DIAGNOSIS — I25.10 CORONARY ARTERY DISEASE INVOLVING NATIVE CORONARY ARTERY OF NATIVE HEART WITHOUT ANGINA PECTORIS: Primary | ICD-10-CM

## 2025-06-24 DIAGNOSIS — E78.5 DYSLIPIDEMIA: ICD-10-CM

## 2025-06-24 PROCEDURE — 99202 OFFICE O/P NEW SF 15 MIN: CPT

## 2025-06-24 PROCEDURE — 1036F TOBACCO NON-USER: CPT | Performed by: INTERNAL MEDICINE

## 2025-06-24 PROCEDURE — 99214 OFFICE O/P EST MOD 30 MIN: CPT | Performed by: INTERNAL MEDICINE

## 2025-06-24 PROCEDURE — 93010 ELECTROCARDIOGRAM REPORT: CPT | Performed by: INTERNAL MEDICINE

## 2025-06-24 PROCEDURE — 93005 ELECTROCARDIOGRAM TRACING: CPT | Performed by: INTERNAL MEDICINE

## 2025-06-24 PROCEDURE — 3008F BODY MASS INDEX DOCD: CPT | Performed by: INTERNAL MEDICINE

## 2025-07-21 DIAGNOSIS — I25.10 CORONARY ARTERY DISEASE, UNSPECIFIED VESSEL OR LESION TYPE, UNSPECIFIED WHETHER ANGINA PRESENT, UNSPECIFIED WHETHER NATIVE OR TRANSPLANTED HEART: ICD-10-CM

## 2025-07-21 RX ORDER — ASPIRIN 81 MG/1
81 TABLET ORAL DAILY
Qty: 90 TABLET | Refills: 3 | Status: SHIPPED | OUTPATIENT
Start: 2025-07-21

## 2025-10-06 ENCOUNTER — APPOINTMENT (OUTPATIENT)
Dept: PRIMARY CARE | Facility: CLINIC | Age: 50
End: 2025-10-06
Payer: COMMERCIAL

## 2025-11-10 ENCOUNTER — APPOINTMENT (OUTPATIENT)
Dept: PRIMARY CARE | Facility: CLINIC | Age: 50
End: 2025-11-10
Payer: COMMERCIAL